# Patient Record
Sex: MALE | Race: WHITE | NOT HISPANIC OR LATINO | URBAN - METROPOLITAN AREA
[De-identification: names, ages, dates, MRNs, and addresses within clinical notes are randomized per-mention and may not be internally consistent; named-entity substitution may affect disease eponyms.]

---

## 2018-03-12 ENCOUNTER — OFFICE VISIT - HEALTHEAST (OUTPATIENT)
Dept: FAMILY MEDICINE | Facility: CLINIC | Age: 28
End: 2018-03-12

## 2018-03-12 DIAGNOSIS — J02.9 PHARYNGITIS: ICD-10-CM

## 2018-03-12 DIAGNOSIS — J10.1 INFLUENZA A: ICD-10-CM

## 2018-03-12 DIAGNOSIS — R50.9 FEVER: ICD-10-CM

## 2018-03-12 LAB
DEPRECATED S PYO AG THROAT QL EIA: NORMAL
FLUAV AG SPEC QL IA: ABNORMAL
FLUBV AG SPEC QL IA: ABNORMAL

## 2018-03-12 ASSESSMENT — MIFFLIN-ST. JEOR: SCORE: 1601.97

## 2018-03-13 LAB — GROUP A STREP BY PCR: NORMAL

## 2020-04-08 ENCOUNTER — COMMUNICATION - HEALTHEAST (OUTPATIENT)
Dept: FAMILY MEDICINE | Facility: CLINIC | Age: 30
End: 2020-04-08

## 2021-06-01 VITALS — BODY MASS INDEX: 21.53 KG/M2 | HEIGHT: 69 IN | WEIGHT: 145.4 LBS

## 2021-06-07 NOTE — TELEPHONE ENCOUNTER
Mother notified letter is ready for Vinnie. Per Mother letter will be mailed to home address to limit exposure.

## 2021-06-07 NOTE — TELEPHONE ENCOUNTER
Mother Sharmin called, states Vinnie is needing a letter advising he should not work currently  due to his  having asthma.  Also that his brother has 2 heart defects,   Mother has asthma, HTN, COPD and emphysema. Family would be at high risk if Vinnie brought home any COVID-19 related germs.     Call Sharmin when letter is ready [phone]

## 2021-06-16 NOTE — PROGRESS NOTES
"Assessment:     1. Fever  Rapid Strep A Screen-Throat    Influenza A/B Rapid Test    Group A Strep, RNA Direct Detection, Throat   2. Pharyngitis     3. Influenza A  oseltamivir (TAMIFLU) 75 MG capsule       Plan:     1. Fever  Patient will take the Tamiflu as prescribed no work 72 hours work slip given  - Rapid Strep A Screen-Throat  - Influenza A/B Rapid Test  - Group A Strep, RNA Direct Detection, Throat    2. Pharyngitis  Related to influenza A    3. Influenza A  Patient may take Tylenol extra strength up to 4 per day for aches and pains begin medicine as soon as possible  - oseltamivir (TAMIFLU) 75 MG capsule; Take 1 capsule (75 mg total) by mouth 2 (two) times a day for 5 days.  Dispense: 10 capsule; Refill: 0      Subjective:   Patient gives 48 hour history of sore throat muscle aches fever to 102 .  Patient's pounding with Tylenol 4 per day gives him some relief last night he had some night sweats and chills.  Patient is seen today and examined laboratory confirms presence of influenza A.  Patient should not work for at least 72 hours to expose his coworkers.  Work slip was given.  Instructions on medication were given.  Past medical history was reviewed patient denies any other constitutional complaints no diarrhea constipation.    Review of Systems: A complete 14 point review of systems was obtained and is negative or as stated in the history of present illness.    No past medical history on file.  Family History   Problem Relation Age of Onset     Adopted: Yes     No Medical Problems Mother      No Medical Problems Father      No past surgical history on file.  Social History   Substance Use Topics     Smoking status: Current Every Day Smoker     Packs/day: 1.00     Smokeless tobacco: Never Used     Alcohol use None         Objective:   /78  Pulse 88  Temp 99.7  F (37.6  C) (Oral)   Ht 5' 8.5\" (1.74 m)  Wt 145 lb 6.4 oz (66 kg)  BMI 21.79 kg/m2    General Appearance:  Normal  Head:  " Normal  Ears: TM anatomy appears normal  Eyes:  Normal  Nose:  Normal  Throat: Posterior pharynx is injected  Neck:  Normal  Back:  Normal  Chest/Breast:Normal  Lungs:  Normal  Heart:  Normal  Abdomen:  Normal  Musculoskeletal:  Normal  Lymphatic:  Normal  Skin/Hair/Nails:  Normal  Neurologic:  Normal  Extremities:  Normal  Genitourinary: Normal  Pulses:  Normal           This note has been dictated using voice recognition software. Any grammatical or context distortions are unintentional and inherent to the the software.

## 2021-06-20 NOTE — LETTER
Letter by Colleen Rodriguez MD at      Author: Colleen Rodriguez MD Service: -- Author Type: --    Filed:  Encounter Date: 4/8/2020 Status: (Other)         April 8, 2020     Patient: Vinnie Dinero   YOB: 1990   Date of Visit: 4/8/2020       To Whom It May Concern:      It is my medical opinion that Vinnie Dinero has an underlying medical condition that places him at high risk for severe disease if he were to contract COVID-19. In addition, his family members also have underlying medical conditions that place them at high risk if they were to contract COVID-19. Please consider arrangements for him to work remotely or take a leave of absence until it is deemed safe for him to return to the workplace so that he does not contract COVID-19 or expose his family members to COVID-19.       If you have any questions or concerns, please don't hesitate to call.    Sincerely,        Electronically signed by Colleen Rodriguez MD